# Patient Record
Sex: FEMALE | Race: WHITE
[De-identification: names, ages, dates, MRNs, and addresses within clinical notes are randomized per-mention and may not be internally consistent; named-entity substitution may affect disease eponyms.]

---

## 2019-01-19 ENCOUNTER — HOSPITAL ENCOUNTER (INPATIENT)
Dept: HOSPITAL 94 - ER | Age: 29
LOS: 1 days | Discharge: HOME | DRG: 639 | End: 2019-01-20
Attending: HOSPITALIST | Admitting: HOSPITALIST
Payer: COMMERCIAL

## 2019-01-19 VITALS — WEIGHT: 127.98 LBS | BODY MASS INDEX: 18.96 KG/M2 | HEIGHT: 69 IN

## 2019-01-19 DIAGNOSIS — E87.5: ICD-10-CM

## 2019-01-19 DIAGNOSIS — E10.10: Primary | ICD-10-CM

## 2019-01-19 DIAGNOSIS — E87.6: ICD-10-CM

## 2019-01-19 DIAGNOSIS — Z79.4: ICD-10-CM

## 2019-01-19 LAB
ALBUMIN SERPL BCP-MCNC: 3 G/DL (ref 3.4–5)
ALBUMIN SERPL BCP-MCNC: 3 G/DL (ref 3.4–5)
ALBUMIN SERPL BCP-MCNC: 3.7 G/DL (ref 3.4–5)
ALBUMIN/GLOB SERPL: 1.1 {RATIO} (ref 1.1–1.5)
ALP SERPL-CCNC: 128 IU/L (ref 46–116)
ALT SERPL W P-5'-P-CCNC: 31 U/L (ref 12–78)
ANION GAP SERPL CALCULATED.3IONS-SCNC: 11 MMOL/L (ref 8–16)
ANION GAP SERPL CALCULATED.3IONS-SCNC: 18 MMOL/L (ref 8–16)
ANION GAP SERPL CALCULATED.3IONS-SCNC: 21 MMOL/L (ref 8–16)
AST SERPL W P-5'-P-CCNC: 16 U/L (ref 10–37)
BACTERIA URNS QL MICRO: (no result) /HPF
BASE EXCESS BLDA CALC-SCNC: -8.2 MMOL/L (ref -2–3)
BASOPHILS # BLD AUTO: 0 X10'3 (ref 0–0.2)
BASOPHILS NFR BLD AUTO: 0.3 % (ref 0–1)
BILIRUB SERPL-MCNC: 0.6 MG/DL (ref 0.1–1)
BODY TEMPERATURE: 37
BUN SERPL-MCNC: 6 MG/DL (ref 7–18)
BUN SERPL-MCNC: 6 MG/DL (ref 7–18)
BUN SERPL-MCNC: 7 MG/DL (ref 7–18)
BUN/CREAT SERPL: 9.5 (ref 6.6–38)
BUN/CREAT SERPL: 9.7 (ref 6.6–38)
BUN/CREAT SERPL: 9.7 (ref 6.6–38)
CALCIUM SERPL-MCNC: 6.9 MG/DL (ref 8.5–10.1)
CALCIUM SERPL-MCNC: 7.3 MG/DL (ref 8.5–10.1)
CALCIUM SERPL-MCNC: 8.4 MG/DL (ref 8.5–10.1)
CHLORIDE SERPL-SCNC: 101 MMOL/L (ref 99–107)
CHLORIDE SERPL-SCNC: 107 MMOL/L (ref 99–107)
CHLORIDE SERPL-SCNC: 109 MMOL/L (ref 99–107)
CLARITY UR: CLEAR
CO2 SERPL-SCNC: 14.3 MMOL/L (ref 24–32)
CO2 SERPL-SCNC: 15.5 MMOL/L (ref 24–32)
CO2 SERPL-SCNC: 21.5 MMOL/L (ref 24–32)
COHGB MFR BLDA: 0 % (ref 0.5–1.5)
COLOR UR: (no result)
CREAT SERPL-MCNC: 0.62 MG/DL (ref 0.4–0.9)
CREAT SERPL-MCNC: 0.63 MG/DL (ref 0.4–0.9)
CREAT SERPL-MCNC: 0.72 MG/DL (ref 0.4–0.9)
DEPRECATED SQUAMOUS URNS QL MICRO: (no result) /LPF
EOSINOPHIL # BLD AUTO: 0.1 X10'3 (ref 0–0.9)
EOSINOPHIL NFR BLD AUTO: 3.1 % (ref 0–6)
ERYTHROCYTE [DISTWIDTH] IN BLOOD BY AUTOMATED COUNT: 16.5 % (ref 11.5–14.5)
GFR SERPL CREATININE-BSD FRML MDRD: > 90 ML/MIN
GLUCOSE SERPL-MCNC: 215 MG/DL (ref 70–104)
GLUCOSE SERPL-MCNC: 265 MG/DL (ref 70–104)
GLUCOSE SERPL-MCNC: 446 MG/DL (ref 70–104)
GLUCOSE UR STRIP-MCNC: >=1000 MG/DL
HCG UR QL: NEGATIVE
HCO3 BLDA-SCNC: 16.3 MMOL/L (ref 22–26)
HCT VFR BLD AUTO: 41.6 % (ref 35–45)
HGB BLD-MCNC: 14.6 G/DL (ref 12–16)
HGB BLDA-MCNC: 13.1 G/DL (ref 12–16)
HGB UR QL STRIP: NEGATIVE
KETONES UR STRIP-MCNC: >=80 MG/DL
LEUKOCYTE ESTERASE UR QL STRIP: NEGATIVE
LYMPHOCYTES # BLD AUTO: 0.9 X10'3 (ref 1.1–4.8)
LYMPHOCYTES NFR BLD AUTO: 24.1 % (ref 21–51)
MAGNESIUM SERPL-MCNC: 1.9 MG/DL (ref 1.5–2.4)
MCH RBC QN AUTO: 31.9 PG (ref 27–31)
MCHC RBC AUTO-ENTMCNC: 35.1 % (ref 33–36.5)
MCV RBC AUTO: 90.9 FL (ref 78–98)
METHGB MFR BLDA: 0.2 % (ref 0.3–1.12)
MONOCYTES # BLD AUTO: 0.4 X10'3 (ref 0–0.9)
MONOCYTES NFR BLD AUTO: 10.5 % (ref 2–12)
MUCOUS THREADS URNS QL MICRO: (no result) /LPF
NEUTROPHILS # BLD AUTO: 2.4 X10'3 (ref 1.8–7.7)
NEUTROPHILS NFR BLD AUTO: 62 % (ref 42–75)
NITRITE UR QL STRIP: NEGATIVE
O2/TOTAL GAS SETTING VFR VENT: 21 MMHG/%
OXYHGB MFR BLDA: 97.3 % (ref 94–100)
PCO2 TEMP ADJ BLDA: 30.5 MMHG (ref 32–45)
PH TEMP ADJ BLDA: 7.34 [PH] (ref 7.35–7.45)
PH UR STRIP: 6 [PH] (ref 4.8–8)
PHOSPHATE SERPL-MCNC: 1.9 MG/DL (ref 2.3–4.5)
PHOSPHATE SERPL-MCNC: 2.3 MG/DL (ref 2.3–4.5)
PLATELET # BLD AUTO: 277 X10'3 (ref 140–440)
PMV BLD AUTO: 8 FL (ref 7.4–10.4)
PO2 TEMP ADJ BLD: 100.2 MMHG (ref 83–108)
POTASSIUM SERPL-SCNC: 2.9 MMOL/L (ref 3.5–5.1)
POTASSIUM SERPL-SCNC: 2.9 MMOL/L (ref 3.5–5.1)
POTASSIUM SERPL-SCNC: 3.5 MMOL/L (ref 3.5–5.1)
PROT SERPL-MCNC: 7.1 G/DL (ref 6.4–8.2)
PROT UR QL STRIP: NEGATIVE MG/DL
RBC # BLD AUTO: 4.57 X10'6 (ref 4.2–5.6)
RBC #/AREA URNS HPF: (no result) /HPF (ref 0–2)
SAO2 % BLDA: 97.5 % (ref 95–98)
SODIUM SERPL-SCNC: 136 MMOL/L (ref 135–145)
SODIUM SERPL-SCNC: 139 MMOL/L (ref 135–145)
SODIUM SERPL-SCNC: 142 MMOL/L (ref 135–145)
SP GR UR STRIP: 1.02 (ref 1–1.03)
URN COLLECT METHOD CLASS: (no result)
UROBILINOGEN UR STRIP-MCNC: 0.2 E.U/DL (ref 0.2–1)
WBC # BLD AUTO: 3.8 X10'3 (ref 4.5–11)
WBC #/AREA URNS HPF: (no result) /HPF (ref 0–4)

## 2019-01-19 PROCEDURE — 36415 COLL VENOUS BLD VENIPUNCTURE: CPT

## 2019-01-19 PROCEDURE — 81025 URINE PREGNANCY TEST: CPT

## 2019-01-19 PROCEDURE — 80048 BASIC METABOLIC PNL TOTAL CA: CPT

## 2019-01-19 PROCEDURE — 83036 HEMOGLOBIN GLYCOSYLATED A1C: CPT

## 2019-01-19 PROCEDURE — 80053 COMPREHEN METABOLIC PANEL: CPT

## 2019-01-19 PROCEDURE — 81001 URINALYSIS AUTO W/SCOPE: CPT

## 2019-01-19 PROCEDURE — 96361 HYDRATE IV INFUSION ADD-ON: CPT

## 2019-01-19 PROCEDURE — 84100 ASSAY OF PHOSPHORUS: CPT

## 2019-01-19 PROCEDURE — 99285 EMERGENCY DEPT VISIT HI MDM: CPT

## 2019-01-19 PROCEDURE — 85018 HEMOGLOBIN: CPT

## 2019-01-19 PROCEDURE — 36600 WITHDRAWAL OF ARTERIAL BLOOD: CPT

## 2019-01-19 PROCEDURE — 82803 BLOOD GASES ANY COMBINATION: CPT

## 2019-01-19 PROCEDURE — 96374 THER/PROPH/DIAG INJ IV PUSH: CPT

## 2019-01-19 PROCEDURE — 82948 REAGENT STRIP/BLOOD GLUCOSE: CPT

## 2019-01-19 PROCEDURE — 85025 COMPLETE CBC W/AUTO DIFF WBC: CPT

## 2019-01-19 PROCEDURE — 83735 ASSAY OF MAGNESIUM: CPT

## 2019-01-19 PROCEDURE — 84132 ASSAY OF SERUM POTASSIUM: CPT

## 2019-01-19 RX ADMIN — DEXTROSE AND SODIUM CHLORIDE SCH MLS/HR: 5; .2 INJECTION, SOLUTION INTRAVENOUS at 19:07

## 2019-01-19 RX ADMIN — POTASSIUM CHLORIDE SCH MEQ: 7.46 INJECTION, SOLUTION INTRAVENOUS at 15:09

## 2019-01-19 RX ADMIN — INSULIN HUMAN PRN MLS/HR: 100 INJECTION, SOLUTION PARENTERAL at 13:23

## 2019-01-19 RX ADMIN — POTASSIUM CHLORIDE SCH MEQ: 7.46 INJECTION, SOLUTION INTRAVENOUS at 13:42

## 2019-01-19 RX ADMIN — INSULIN HUMAN PRN MLS/HR: 100 INJECTION, SOLUTION PARENTERAL at 11:59

## 2019-01-19 NOTE — NUR
SPOKE WITH DR JEFFERS. STATED PLACING ORDERS TO TAKE PT OFF DKA PROTOCOL. ORDER 
TO GIVE INSULIN 4 UNITS SQ, 30 MIN LATER TO STOP THE INSULIN GTT AND CHANGE 
FLUIDS TO NS WITH 20K RUNNING AT 150ML/HR.

## 2019-01-19 NOTE — NUR
PATIENT AMB TO ER #[id number] WITH C/O 20# WEIGHT LOSS, INCREASED THIRST, SUGARY TASTE 
IN MOUTH, AND HUNGER. STATES SHE CALLED HER DOCTOR AND WAS INSTRUCTED TO COME 
TO ER FOR EVALUATION FOR NEW ONSET OF DIABETES. ACCOMPANIED BY SIG OTHER.

## 2019-01-19 NOTE — NUR
Per md orders, insulin drip stopped at 1835. Pt consumed 53  g, denies N/V.  
c/o irritation in IV Site with K running.  Switched K to another IV site for 
comfort and rate decreased.  NS 20K also running at reduced rate to avoid IV 
irritation.

## 2019-01-19 NOTE — NUR
Diabetic education provided for fingerstick blood glucose test and self 
injection of insulin.  Pt stuck her own finger for BG check and self 
administered lantus succesfuly with supervision. Verbalized understanding of 
process of BG check and insulin adminstration.

## 2019-01-20 VITALS — DIASTOLIC BLOOD PRESSURE: 59 MMHG | SYSTOLIC BLOOD PRESSURE: 91 MMHG

## 2019-01-20 LAB
ALBUMIN SERPL BCP-MCNC: 3.1 G/DL (ref 3.4–5)
ALBUMIN/GLOB SERPL: 1.1 {RATIO} (ref 1.1–1.5)
ALP SERPL-CCNC: 101 IU/L (ref 46–116)
ALT SERPL W P-5'-P-CCNC: 24 U/L (ref 12–78)
ANION GAP SERPL CALCULATED.3IONS-SCNC: 14 MMOL/L (ref 8–16)
AST SERPL W P-5'-P-CCNC: 13 U/L (ref 10–37)
BILIRUB SERPL-MCNC: 0.5 MG/DL (ref 0.1–1)
BUN SERPL-MCNC: 5 MG/DL (ref 7–18)
BUN/CREAT SERPL: 9.3 (ref 6.6–38)
CALCIUM SERPL-MCNC: 7.8 MG/DL (ref 8.5–10.1)
CHLORIDE SERPL-SCNC: 106 MMOL/L (ref 99–107)
CO2 SERPL-SCNC: 18.9 MMOL/L (ref 24–32)
CREAT SERPL-MCNC: 0.54 MG/DL (ref 0.4–0.9)
GFR SERPL CREATININE-BSD FRML MDRD: > 90 ML/MIN
GLUCOSE SERPL-MCNC: 222 MG/DL (ref 70–104)
MAGNESIUM SERPL-MCNC: 1.7 MG/DL (ref 1.5–2.4)
PHOSPHATE SERPL-MCNC: 3.5 MG/DL (ref 2.3–4.5)
POTASSIUM SERPL-SCNC: 3.1 MMOL/L (ref 3.5–5.1)
PROT SERPL-MCNC: 5.9 G/DL (ref 6.4–8.2)
SODIUM SERPL-SCNC: 139 MMOL/L (ref 135–145)

## 2019-01-20 RX ADMIN — INSULIN LISPRO SCH UNITS: 100 INJECTION, SOLUTION INTRAVENOUS; SUBCUTANEOUS at 13:40

## 2019-01-20 RX ADMIN — DEXTROSE AND SODIUM CHLORIDE SCH MLS/HR: 5; .2 INJECTION, SOLUTION INTRAVENOUS at 00:55

## 2019-01-20 RX ADMIN — DEXTROSE AND SODIUM CHLORIDE SCH MLS/HR: 5; .2 INJECTION, SOLUTION INTRAVENOUS at 11:49

## 2019-01-20 RX ADMIN — INSULIN LISPRO SCH UNITS: 100 INJECTION, SOLUTION INTRAVENOUS; SUBCUTANEOUS at 09:20

## 2019-01-20 NOTE — NUR
Paged Dr. Restrepo:



PAGER ID:  [phone] 

MESSAGE:  Leticia PCU [phone]. RE: Behnfeldt, Kelsey [id number]. Have glucometer for patient to take 
home but it only comes with 10 strips. Can I call in an rx for extra strips for patient to 
her pharmacy. Thank you. Leticia

## 2019-01-20 NOTE — NUR
Patient stable for discharge per MD orders.  New prescriptions called into [facility] on [address].  Education provided to patient on SQ Humalog/lantus administration.  Patient provided 
glucometer for blood glucose assessment.  Patient provided information on Tuesday diabetes 
class at [facility].  PIV discontinued, cannula intact.  Telemetry monitoring discontinued.  All 
questions answered regarding discharge.  All belongings sent with patient to home in private 
vehicle accompanied by .  Patient wheeled out to parking lot by PCT.

## 2019-01-20 NOTE — NUR
DM Consult: Pt newly DX T1DM admit w/ DKA  A1C 11.3, and hx 20# wt 

loss past 1.5 months. Advanced to carb controlled diet and PO 75% first 

meal. RD met w/ pt/ at bedside and provided written/verbal DM ed, 

new DM pamphlet, recommended attending CDE course and establishing w/ 

endocrinologist, and RD contact information in case further questions. RD 

reviewed meal frequency, portion sizing, types of carbs, proper GLU 

maintenance during exercise, importance of proteins, and fast food 

options. Pt reports hx colon polyps and already follows high fiber diet at 

home. RD also reviewed DEX tabs and foods to go to when having lows in 

addition to recommending pt to notify her PCP if ever having consistent 

lows in AM. Pt/ have no further questions at this time. D/c orders 

pending. Will continue to monitor.

Rec:

1. continue carb controlled diet

2. monitor for additional questions/concerns w/ new T1DM

3. routine bowel care per MD for hx colon polyps

4. wt per rx

-------------------------------------------------------------------------------

Addendum: 01/20/19 at 1326 by Nacho Frey RD

-------------------------------------------------------------------------------

Amended: Links added.

## 2019-01-20 NOTE — NUR
New Order per Dr. Restrepo:  100 accu-check test strips, 100 insulin syringes (1 mL).  Will 
call order into [facility] for patient.

## 2019-01-20 NOTE — NUR
Patient arrived to U 3027A, accompanied by ER RN, Polly.  Patient ambulated to bed with 
standby assistance.  VS:  T 97.9. HR 84, RR: 16, 02: 99 RA, BP 98/53.  Patient oriented to 
room and call light.  All questions answered.  Patient is in no acute distress.  Will 
continue to monitor.

## 2019-01-20 NOTE — NUR
Patient in room ED 14. I have received report from Polly EWING and had the opportunity to 
ask questions and assume patient care.